# Patient Record
(demographics unavailable — no encounter records)

---

## 2025-02-10 NOTE — PHYSICAL EXAM
[Normal] : no edema, no cyanosis, no clubbing, no varicosities [No Rash] : no rash [Moves all extremities] : moves all extremities [No Focal Deficits] : no focal deficits [Normal Speech] : normal speech [Alert and Oriented] : alert and oriented

## 2025-02-11 NOTE — DISCUSSION/SUMMARY
[FreeTextEntry1] : 76-year-old female w/ PMHx of anxiety, IBS, L4-5 degenerative spondylolisthesis, HTN, HLD, DMII who presents today to for a follow up.  #HTN: well controlled currently  - will decrease coreg to 3.125mg BID due to dizziness  - c/w irbesartan 150 mg oral daily (amlodipine was stopped)  - will check BMP  #Pericardial effusion Noted on TTE from July 2024.  - repeat limited TTE  #DMII #HLD #ASCVD risk optimization  a1c and LDL well controlled.  - continue with Mounjaro 7.5mg weekly (metformin has been stopped); can decrease depending on her symptoms and continued weight loss; advised increased hydration especially in the summer months  - lipids well controlled on Crestor 20mg qd; c/w current dosage  - advised to continue healthy lifestyle modifications - C/w Aspirin 81 mg oral daily   Follow up with Dr Angelo in person in 6 months. [EKG obtained to assist in diagnosis and management of assessed problem(s)] : EKG obtained to assist in diagnosis and management of assessed problem(s)

## 2025-02-11 NOTE — HISTORY OF PRESENT ILLNESS
[FreeTextEntry1] : 76-year-old female w/ PMHx of anxiety, IBS, L4-5 degenerative spondylolisthesis, HTN, HLD, DMII who presents today to for a follow up.  Patient states that she has been in good health. She has been eating healthy and walking for exercise. No chest pain, SOB, palpitations, nausea, vomiting, PND, or orthopnea reported. Reports some intermittent dizziness when standing. She is traveling to [place] next week for a couple months.  ========================== Patient states that since starting the Mounjaro on 10/2023, she has lost about 35 pounds. She has been between 140-146 lbs. Based on supply, she has been taking the 5mg of Mounjaro as she was not able to find the 7.5mg dosage. She is very pleased with her overall progress since starting the drug (didn't tolerate SGLT2i when tried in 10/23) and has stopped the metformin. She does report some mild nausea and occasional lightheadedness, but otherwise feels she is tolerating well.   =================================================================================== Previously Jardiance was stopped because of metallic taste in the mouth which resolved after stopping the drug.   PSH: cholecystectomy ovarian cyst removals arm fracture repair   Lifestyle History: Mediterranean Diet Score (9 question survey) was 5.  (8-9: optimal, 6-7: near optimal, 4-5: suboptimal, 0-3: markedly suboptimal) Exercise: No dedicated exercise, but pt walks daily.  Smoking: Patient denies any history of smoking.  EtOH: rarely  Stress: Patient denies any significant stress.  Sleep apnea: she reports being told she snores; poor sleep quality   Do you have polycystic ovarian syndrome? Yes 		 Have you ever been pregnant?  If so, how many times? No  Have you gone through menopause? If yes: at what age? Yes, in her 56 Did you receive hormone replacement?	 No   Mother: DM; PPM in her 70s; CHF;  at age 90  Father: DM; CABG in his 60s; had a second procedure later on; CHF;  at 75 one brother:  from cancer at age 56 =========================== Ambulatory BP monitoring (2023): - Waketime average: 124/68 - Nighttime average: 149/72   Cardiac Data 2024 TTE - mod LVH, EF 65-70%, no significant valvular disease, mild to mod pericardial effusion  Echo (23):  nl LV thickness is mildly increased, LV systolic function is nl w/ Ef 68%, nl LV diastolic function, nl RV function, no significant valvular disease, small pericardial effusion  Aug 2024 lipids  HDL 64 LDL 65 ; a1c 5.3% Lipid panel (2023): , , HDL 61, LDL 49 Lipid panel (23): , T chol 166, HDL 68, LDL 68 A1C (2023): 5.6  A1C (2023): 6.0 A1C (23): 6.7 A1C (3/20/23): 7.5

## 2025-02-11 NOTE — REASON FOR VISIT
[CV Risk Factors and Non-Cardiac Disease] : CV risk factors and non-cardiac disease [Hypertension] : hypertension [FreeTextEntry3] : Dr. Jaclyn Nieto

## 2025-04-23 NOTE — HISTORY OF PRESENT ILLNESS
[FreeTextEntry1] : Patient is a 75 yo F with HTN, DM, obesity now resolved, urinary incontinence (improved with obesity improvement) who presents for followup of SARAHI on CKD  Reviewed prior labs in detail, has been keeping up with fluids, will recheck labs today,. Otherwise feels well no changes in BP no sx of hypotension

## 2025-04-23 NOTE — HISTORY OF PRESENT ILLNESS
[FreeTextEntry1] : Patient is a 77 yo F with HTN, DM, obesity now resolved, urinary incontinence (improved with obesity improvement) who presents for followup of SARAHI on CKD  Reviewed prior labs in detail, has been keeping up with fluids, will recheck labs today,. Otherwise feels well no changes in BP no sx of hypotension

## 2025-04-23 NOTE — ASSESSMENT
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

## 2025-06-02 NOTE — PHYSICAL EXAM
[No Edema] : there was no peripheral edema [No Extremity Clubbing/Cyanosis] : no extremity clubbing/cyanosis [No Focal Deficits] : no focal deficits [Normal Gait] : normal gait [Alert and Oriented x3] : oriented to person, place, and time [Normal] : affect was normal and insight and judgment were intact [de-identified] : Appears uncomfortable [de-identified] : Healing laparoscopy incisions, old midline scar.  Sluggish bowel sounds diffusely.  Moderate tenderness to palpation of right lower quadrant and bilateral upper quadrants.  No rebound tenderness. [33246 - High Complexity requires an extensive number of possible diagnoses and/or the management options, extensive complexity of the medical data (tests, etc.) to be reviewed, and a high risk of significant complications, morbidity, and/or mortality as w] : High Complexity

## 2025-06-02 NOTE — PHYSICAL EXAM
[No Edema] : there was no peripheral edema [No Extremity Clubbing/Cyanosis] : no extremity clubbing/cyanosis [No Focal Deficits] : no focal deficits [Normal Gait] : normal gait [Alert and Oriented x3] : oriented to person, place, and time [Normal] : affect was normal and insight and judgment were intact [de-identified] : Appears uncomfortable [de-identified] : Healing laparoscopy incisions, old midline scar.  Sluggish bowel sounds diffusely.  Moderate tenderness to palpation of right lower quadrant and bilateral upper quadrants.  No rebound tenderness. [41347 - High Complexity requires an extensive number of possible diagnoses and/or the management options, extensive complexity of the medical data (tests, etc.) to be reviewed, and a high risk of significant complications, morbidity, and/or mortality as w] : High Complexity

## 2025-06-02 NOTE — HISTORY OF PRESENT ILLNESS
[Post-hospitalization from ___ Hospital] : Post-hospitalization from [unfilled] Hospital [Admitted on: ___] : The patient was admitted on [unfilled] [Discharged on ___] : discharged on [unfilled] [Discharge Summary] : discharge summary [Pertinent Labs] : pertinent labs [Radiology Findings] : radiology findings [Discharge Med List] : discharge medication list [Med Reconciliation] : medication reconciliation has been completed [Patient Contacted By: ____] : and contacted by [unfilled] [FreeTextEntry2] : She presented to the ER with acute abdominal pain and new onset nausea with multiple episodes of bilious emesis.  She was found to have closed-loop small bowel obstruction with possible internal hernia, mesenteric edema.  Status post laparoscopic lysis of adhesions and reduction of internal hernia by Dr. Lucero.  Labs were notable for leukocytosis to 12 with left shift that resolved by discharge, anemia with low of hemoglobin around 9, creatinine of 1.5 slightly increased from baseline.  She reports that since discharge, she has felt her bowel movements are more sluggish and she is concerned that she is redeveloping obstruction.  Had some iced coffee, tomato juice, cottage cheese today. Had some tuna and roasted potatoes last night. No nausea/vomiting. Does feel bloated. Passing gas, but less frequently than usual.  She did have a small BM yesterday.  She has been active, walking a lot since discharge. Trying to stay hydrated. She's still on a low fiber diet post-surgery.   Was on Mounjaro 7.5 mg previously, hasn't restarted. Recommended she hold off restarting for now. We discussed that the decreased gut mobility from the medication could have contributed even though she did have adhesions.    In a new relationship, this past winter did a lot of hiking, snowshoeing, cross country skiing. She wants to continue the active lifestyle she's had with him.   A lot of stress because phone isn't working, TV issue, trying to replan vacations that were cancelled.  Spoke to patient by phone around 6 PM at her request after she had had abdominal x-ray performed at [facility] imaging.  I do not see any apparent acute findings on her x-ray.  She has some stool burden with some dilated loops of bowel, which is not unexpected postoperatively.  Official radiology read will be available on Monday.  Also qualified that I do not interpret abdominal films frequently.  If her symptoms are worsening over the weekend, she agrees that she will go back to the emergency room.

## 2025-06-02 NOTE — HISTORY OF PRESENT ILLNESS
[Post-hospitalization from ___ Hospital] : Post-hospitalization from [unfilled] Hospital [Admitted on: ___] : The patient was admitted on [unfilled] [Discharged on ___] : discharged on [unfilled] [Discharge Summary] : discharge summary [Pertinent Labs] : pertinent labs [Radiology Findings] : radiology findings [Discharge Med List] : discharge medication list [Med Reconciliation] : medication reconciliation has been completed [Patient Contacted By: ____] : and contacted by [unfilled] [FreeTextEntry2] : She presented to the ER with acute abdominal pain and new onset nausea with multiple episodes of bilious emesis.  She was found to have closed-loop small bowel obstruction with possible internal hernia, mesenteric edema.  Status post laparoscopic lysis of adhesions and reduction of internal hernia by Dr. Lucero.  Labs were notable for leukocytosis to 12 with left shift that resolved by discharge, anemia with low of hemoglobin around 9, creatinine of 1.5 slightly increased from baseline.  She reports that since discharge, she has felt her bowel movements are more sluggish and she is concerned that she is redeveloping obstruction.  Had some iced coffee, tomato juice, cottage cheese today. Had some tuna and roasted potatoes last night. No nausea/vomiting. Does feel bloated. Passing gas, but less frequently than usual.  She did have a small BM yesterday.  She has been active, walking a lot since discharge. Trying to stay hydrated. She's still on a low fiber diet post-surgery.   Was on Mounjaro 7.5 mg previously, hasn't restarted. Recommended she hold off restarting for now. We discussed that the decreased gut mobility from the medication could have contributed even though she did have adhesions.    In a new relationship, this past winter did a lot of hiking, snowshoeing, cross country skiing. She wants to continue the active lifestyle she's had with him.   A lot of stress because phone isn't working, TV issue, trying to replan vacations that were cancelled.  Spoke to patient by phone around 6 PM at her request after she had had abdominal x-ray performed at [facility] imaging.  I do not see any apparent acute findings on her x-ray.  She has some stool burden with some dilated loops of bowel, which is not unexpected postoperatively.  Official radiology read will be available on Monday.  Also qualified that I do not interpret abdominal films frequently.  If her symptoms are worsening over the weekend, she agrees that she will go back to the emergency room.

## 2025-06-09 NOTE — DISCUSSION/SUMMARY
[de-identified] : I do feel the character and nature location of her symptoms so the physical examination today is most consistent with a pain originating from the right-sided hip joint.  This may be due to some early degenerative arthritis.  We discussed all the treatment options for this and agreed to proceed with a course of physical therapy.  She will continue take Tylenol for symptomatic relief.  She is traveling extensively this summer depending on the results of therapy may consider referral to one of my hip specialist colleagues in the future   I have spent greater than 45 minutes preparing to see the patient, collecting relevant history, performing a thorough history and physical examination, counseling the patient regarding my findings ordering the appropriate therapies and tests, communicating with other relevant healthcare professionals, documenting my encounter and coordinating care.

## 2025-06-09 NOTE — HISTORY OF PRESENT ILLNESS
[de-identified] : Returns today with regards to right-sided groin pain.  This has been bothering her for the past 1 to 2 months.  This seems to be worse when walking.  Is also associated with some posterior gluteal occasional hamstring symptoms.  Denies any distal radiating pain numbness tingling weakness in the extremities

## 2025-06-09 NOTE — PHYSICAL EXAM
[UE/LE] : Sensory: Intact in bilateral upper & lower extremities [Normal] : Alert and in no acute distress [de-identified] : 4 view lumbar spine x-ray 6/9/2025 PACS Anterolisthesis with some mobility in flexion L4-5  CT of the abdomen [facility] indicates some degenerative changes bilateral hips [de-identified] : Range of motion right hip reproduces groin pain